# Patient Record
Sex: FEMALE | URBAN - METROPOLITAN AREA
[De-identification: names, ages, dates, MRNs, and addresses within clinical notes are randomized per-mention and may not be internally consistent; named-entity substitution may affect disease eponyms.]

---

## 2020-01-28 NOTE — PATIENT DISCUSSION
Cataract surgery has been performed in the first eye and activities of daily living are still impaired. The patient would like to proceed with cataract surgery in the second eye as scheduled. The patient elects CV TMF OS, goal of Christine.

## 2020-01-28 NOTE — PATIENT DISCUSSION
Instructed to call immediately if any new distortion, blurring, decreased vision or eye pain. Physical Therapy Daily Progress Note        Visit # : 11  Ronni Reyespoonam reports: I had the radio frequency treatment a couple of weeks ago and ever since my back has been hurting worse - would like to try Dry Needling today    Subjective     Objective       Palpation   Left   Hypertonic in the erector spinae, lumbar interspinals, lumbar paraspinals and quadratus lumborum.     Right   Hypertonic in the erector spinae, lumbar interspinals, lumbar paraspinals and quadratus lumborum.      See Exercise, Manual, and Modality Logs for complete treatment.       Assessment & Plan     Assessment  Assessment details: Patient presents with increased tenderness bilateral PSIS, and lumbar PVM's. Tolerated gentle stabilization progressions well today. Improved muscle tone after Dry Needling. Cont PT 1x per week for Dry Needling as indicated by patient .         Progress strengthening /stabilization /functional activity           Manual Therapy:         mins  12300;  Therapeutic Exercise:        mins  22969;     Neuromuscular Kamini:        mins  27587;    Therapeutic Activity:          mins  75542;     Gait Training:          mins  24545;     Ultrasound:          mins  54486;    Electrical Stimulation:         mins  54214 ( );  Dry Needling     15     mins self-pay    Timed Treatment:      mins   Total Treatment:     25   mins    Wen Stanley, PT  Physical Therapist  [place] License # [id number]

## 2020-02-28 NOTE — PATIENT DISCUSSION
4 week PO: Patient is doing well post-operatively.  Patient to call if any visual changes or concerns.

## 2020-08-11 ENCOUNTER — IMPORTED ENCOUNTER (OUTPATIENT)
Dept: URBAN - METROPOLITAN AREA CLINIC 9 | Facility: CLINIC | Age: 35
End: 2020-08-11

## 2020-12-22 NOTE — PATIENT DISCUSSION
If chalazion worsens, patient advised to return to clinic. Will consider I&D on follow up if no improvement/resolution.

## 2020-12-22 NOTE — PATIENT DISCUSSION
Patient understands condition, prognosis and need for follow up care for BP with PCP. 5/5 normal strength.

## 2020-12-29 NOTE — PATIENT DISCUSSION
If chalazion worsens, patient advised to return to clinic. Will consider I&D on follow up if no improvement/resolution. Eye

## 2021-02-03 NOTE — PROCEDURE NOTE: SURGICAL
<p>Prior to commencing surgery patient identification, surgical procedure, site, and side were confirmed by Dr. Rosemarie Marvin. Following topical proparacaine anesthesia, the patient was positioned at the YAG laser, a contact lens coupled to the cornea with methylcellulose and an axial posterior capsulotomy performed without complication using 2.1 Mj x 23. Excess methylcellulose was washed from the eye, one drop of Alphagan was instilled and the patient returned to the holding area having tolerated the procedure well and without complication. </p><p>MRN:[id number]</p>

## 2021-10-16 ASSESSMENT — TONOMETRY
OS_IOP_MMHG: 13
OD_IOP_MMHG: 13

## 2021-10-16 ASSESSMENT — VISUAL ACUITY
OS_SC: 20/20 SN
OD_SC: 20/20 SN

## 2022-11-30 NOTE — PATIENT DISCUSSION
Cataract surgery has been performed in the first eye and activities of daily living are still impaired. The patient would like to proceed with cataract surgery in the second eye as scheduled. The patient elects CV TMF OS, goal of Christine. [Care Plan reviewed and provided to patient/caregiver] : Care plan reviewed and provided to patient/caregiver [Understands and communicates without difficulty] : Patient/Caregiver understands and communicates without difficulty

## 2023-07-21 ENCOUNTER — ESTABLISHED PATIENT (OUTPATIENT)
Dept: URBAN - METROPOLITAN AREA CLINIC 4 | Facility: CLINIC | Age: 38
End: 2023-07-21

## 2023-07-21 DIAGNOSIS — H10.813: ICD-10-CM

## 2023-07-21 PROCEDURE — 99213 OFFICE O/P EST LOW 20 MIN: CPT

## 2023-07-21 RX ORDER — FLUOROMETHOLONE 1 MG/ML: 1 SOLUTION/ DROPS OPHTHALMIC TWICE A DAY

## 2023-07-21 ASSESSMENT — KERATOMETRY
OD_K1POWER_DIOPTERS: 40.75
OS_AXISANGLE_DEGREES: 180
OD_AXISANGLE2_DEGREES: 94
OS_AXISANGLE2_DEGREES: 90
OD_K2POWER_DIOPTERS: 41.50
OS_K2POWER_DIOPTERS: 41.50
OS_K1POWER_DIOPTERS: 41.00
OD_AXISANGLE_DEGREES: 4

## 2023-07-21 ASSESSMENT — VISUAL ACUITY
OU_SC: 20/20
OS_SC: 20/20
OD_SC: 20/20

## 2023-07-21 ASSESSMENT — TONOMETRY
OS_IOP_MMHG: 19
OD_IOP_MMHG: 18

## 2024-03-02 NOTE — PATIENT DISCUSSION
Despite some risk factors, the patient does not demonstrate definitive evidence of glaucoma at this time.
Discussed the continued use of warm compresses.
Discussed the risks/benefits of YAG Laser Capsulotomy.
If chalazion worsens, patient advised to return to clinic. Will consider I&D on follow up if no improvement/resolution.
Instructed to call immediately if any new distortion, blurring, decreased vision or eye pain.
Monitor.
No Glasses Prescription given to patient.
Patient advised to call if any problems, questions, or concerns.
Patient desires YAG Laser Capsulotomy Consult OS.
Patient educated on condition.
Patient education, 2nd recurrrence; to PCP to R/O assoc inflammatory disease.
Patient made aware of 24/7 emergency services.
Patient understands condition, prognosis and need for follow up care for BP with PCP.
Patient understands condition, prognosis and need for follow up care.
[facility].
Recommended observation.
Rx drops TID x 2 weeks.
Surgical vs nonsurgical treatment options were discussed.
The IOP is in the target range.
The patient elects to proceed with nonsurgical treatment.
Will check refraction again at next visit once CHYNA chalazion resolved.
Congruent

## 2025-05-12 NOTE — PATIENT DISCUSSION
Patient advised to call if any problems, questions, or concerns. Patient called requesting to speak with a nurse from Dr. Roth's office or if Dr. Roth recommends an Orthopedic doctor, due to patient pain in their right knee.